# Patient Record
Sex: FEMALE | Race: BLACK OR AFRICAN AMERICAN
[De-identification: names, ages, dates, MRNs, and addresses within clinical notes are randomized per-mention and may not be internally consistent; named-entity substitution may affect disease eponyms.]

---

## 2017-08-04 NOTE — RADIOLOGY REPORT (SQ)
EXAM DESCRIPTION:  UPPER GI/SM BOWEL



COMPLETED DATE/TIME:  8/4/2017 10:33 am



REASON FOR STUDY:  ANOREXIA (R63.0) WITH WEIGHT LOSS (N63.4) R63.0  ANOREXIA R63.4  ABNORMAL WEIGHT L
OSS



COMPARISON:  CT abdomen pelvis 3/15/2013



TECHNIQUE:  Under fluoroscopic guidance, patient ingested effervescent  granules followed by thick an
d thin barium.  Fluoroscopic spot images and  routine radiographic images acquired and stored on PACS
.

Following evaluation of esophagus and stomach, additional  barium administered with serial delayed ab
dominal radiographs until colonic  identification.  Fluoroscopic images recorded of the terminal ileu
m.

12 MM BARIUM TABLET GIVEN: Yes.

No significant delay in passage.



FLUOROSCOPY TIME:  3.3 minutes

17 fluoroscopic digital images saved to PACS.



LIMITATIONS:  None.



FINDINGS:  NEUROMUSCULAR COORDINATION OF SWALLOW: Normal.  No aspiration.

ESOPHAGEAL MOTILITY: Normal peristalsis.  No esophageal spasm.

ESOPHAGEAL MUCOSA: Normal mucosa without masses or ulceration.

GASTRO-ESOPHAGEAL JUNCTION: No hiatal hernia or reflux.

STOMACH: Normal without masses or ulcerations.

GASTRIC OUTLET: No delay in emptying.  Normal pylorus.

DUODENAL BULB: Normal distention. No spasm or ulceration.

DUODENUM: Mucosa normal.  No extrinsic masses or malrotation.

PROXIMAL SMALL BOWEL: Normal as visualized.

JEJUNUM: Normal mucosal pattern.  No dilatation, segmentation, strictures or masses.

ILEUM: Normal mucosal pattern.  No dilatation, segmentation, strictures or masses.

TERMINAL ILEUM AND ILEO-CECAL VALVE: Normal mucosal pattern without cobble-stoning or stricture.  Nor
mal compression.

PROXIMAL COLON: Incompletely imaged.  No abnormality.

NON-GI TRACT STRUCTURES: No significant finding.

OTHER: No other significant finding.



IMPRESSION:  NORMAL DOUBLE CONTRAST BARIUM SWALLOW/UPPER GI SERIES/SMALL BOWEL SERIES.



COMMENT:  Quality :  Final reports for procedures using fluoroscopy that document radiation exp
osure indices, or exposure time and number of fluorographic images (if radiation exposure indices are
 not available)



TECHNICAL DOCUMENTATION:  JOB ID:  [id number]

 2011 [employer]- All Rights Reserved

## 2017-10-09 NOTE — RADIOLOGY REPORT (SQ)
EXAM DESCRIPTION:  CT ABD/PELVIS ORAL ONLY



COMPLETED DATE/TIME:  10/9/2017 9:17 am



REASON FOR STUDY:  ANOREXIA/R UPPER ABD PAIN R63.0  ANOREXIA R10.11  RIGHT UPPER QUADRANT PAIN F12.10
  CANNABIS ABUSE, UNCOMPLICATED



COMPARISON:  CT abdomen pelvis 3/15/2013



TECHNIQUE:  CT scan of the abdomen and pelvis performed without intravenous contrast.

Patient drank oral contrast.

Images reviewed with lung, soft tissue, and bone windows. Reconstructed coronal and sagittal MPR imag
es reviewed. All images stored on PACS.

All CT scanners at this facility use dose modulation, iterative reconstruction, and/or weight based d
osing when appropriate to reduce radiation dose to as low as reasonably achievable (ALARA).

[facility]: Dose Right  [facility]: CareDose    [facility]: Dose Right    [facility]: Teradose 4D    [facility]: Smart Technologies



RADIATION DOSE:  Up-to-date CT equipment and radiation dose reduction techniques were employed. CTDIv
ol: 3.5 mGy. DLP: 180 mGy-cm.mGy.



LIMITATIONS:  None.



FINDINGS:  There is a thin rim of fluid between the liver and right hemidiaphragm, best shown on aayush
nal image 47 and axial images 16 through 24.  There is a moderate amount of free cul-de-sac fluid.  T
his is a nonspecific finding.

LOWER CHEST: No significant findings. No nodules or infiltrates.

NON-CONTRASTED LIVER, SPLEEN, ADRENALS: Multiple hypodense liver lesions are present, similar compare
d to 2013 likely 1.9 cm hemangioma right lobe sub- diaphragmatic surface image 13, and hepatic cysts 
along the right lobe sub- diaphragmatic surface image 15, and left lobe liver near the falciform liga
ment image 27.  Spleen, adrenal glands unremarkable.

PANCREAS: No masses. No peripancreatic inflammatory changes.

GALLBLADDER: No identified stones by CT criteria. No inflammatory changes to suggest cholecystitis.

RIGHT KIDNEY AND URETER: No suspicious masses. Assessment limited by lack of IV contrast.  12 mm cyst
 right lower pole kidney.  No significant calcifications.   No hydronephrosis or hydroureter.

LEFT KIDNEY AND URETER: No suspicious masses. Assessment limited by lack of IV contrast.   No signifi
cant calcifications.   No hydronephrosis or hydroureter.

AORTA AND RETROPERITONEUM: No aneurysm. No retroperitoneal masses or adenopathy.

BOWEL AND PERITONEAL CAVITY: No obvious masses or inflammatory changes. No free fluid.  Patient drank
 oral contrast.  No CT evidence of bowel obstruction.  Few ascending colon diverticuli without CT sig
ns of acute diverticulitis.

APPENDIX: Normal.

PELVIS, BLADDER, AND ABDOMINAL WALL:No abnormal masses. No free fluid. Bladder normal.  Normal size u
terus and ovaries.

BONES: No significant findings.

OTHER: No other significant finding.



IMPRESSION:  MRI of the right subphrenic fluid between the liver and diaphragm.  Moderate amount of f
ree cul-de-sac fluid.  This is abnormal but nonspecific.

No CT evidence of bowel obstruction.  Ascending colon diverticuli without CT signs of diverticulitis.
  Normal appendix.



COMMENT:  Quality ID # 436: Final reports with documentation of one or more dose reduction techniques
 (e.g., Automated exposure control, adjustment of the mA and/or kV according to patient size, use of 
iterative reconstruction technique)



TECHNICAL DOCUMENTATION:  JOB ID:  [id number]

 2011 [employer]- All Rights Reserved

## 2018-05-09 ENCOUNTER — HOSPITAL ENCOUNTER (OUTPATIENT)
Dept: HOSPITAL 62 - END | Age: 63
Discharge: HOME | End: 2018-05-09
Attending: INTERNAL MEDICINE
Payer: MEDICAID

## 2018-05-09 VITALS — DIASTOLIC BLOOD PRESSURE: 63 MMHG | SYSTOLIC BLOOD PRESSURE: 102 MMHG

## 2018-05-09 DIAGNOSIS — R32: ICD-10-CM

## 2018-05-09 DIAGNOSIS — E78.00: ICD-10-CM

## 2018-05-09 DIAGNOSIS — Z79.82: ICD-10-CM

## 2018-05-09 DIAGNOSIS — K29.50: Primary | ICD-10-CM

## 2018-05-09 DIAGNOSIS — M19.90: ICD-10-CM

## 2018-05-09 DIAGNOSIS — K20.9: ICD-10-CM

## 2018-05-09 DIAGNOSIS — Z79.899: ICD-10-CM

## 2018-05-09 PROCEDURE — 43239 EGD BIOPSY SINGLE/MULTIPLE: CPT

## 2018-05-09 PROCEDURE — 88342 IMHCHEM/IMCYTCHM 1ST ANTB: CPT

## 2018-05-09 PROCEDURE — 88305 TISSUE EXAM BY PATHOLOGIST: CPT

## 2018-05-09 RX ADMIN — MIDAZOLAM HYDROCHLORIDE ONE MG: 1 INJECTION, SOLUTION INTRAMUSCULAR; INTRAVENOUS at 09:00

## 2018-05-09 RX ADMIN — MIDAZOLAM HYDROCHLORIDE ONE MG: 1 INJECTION, SOLUTION INTRAMUSCULAR; INTRAVENOUS at 09:04

## 2018-05-09 NOTE — OPERATIVE REPORT
Operative Report


DATE OF SURGERY: 05/09/18


Operative Report: 





The risks benefits and alternatives of the procedure explained to the patient 

in detail and informed consent is obtained.A GIF Olympus video scope was 

inserted into the patient's mouth and hypopharynx, the esophagus is identified 

intubated and insufflated, the scope was then advanced through the esophagus 

stomach and duodenum, retroflexion maneuver is done, the esophagus stomach and 

first and second portions of the duodenum examined


PREOPERATIVE DIAGNOSIS: Epigastric pain


POSTOPERATIVE DIAGNOSIS: Gastritis status post biopsy.  Esophagitis versus 

Camarena's status post biopsy


OPERATION: EGD with biopsy


SURGEON: HARIKA STRICKLAND


ANESTHESIA: Moderate Sedation - 4 mg of Versed, 25 mcg of fentanyl.  Conscious 

sedation monitoring time 30 minutes.


TISSUE REMOVED OR ALTERED: As noted above.


COMPLICATIONS: 





None.


ESTIMATED BLOOD LOSS: None.


INTRAOPERATIVE FINDINGS: As noted above.


PROCEDURE: 





Patient tolerated procedure well.


No immediate postprocedure complications are noted.


Patient discharged in good condition.


Discharge date 5/9/2018.


Discharge diet: Regular.


Discharge activity: Regular.


2-3 week follow-up to discuss findings.


Patient is instructed to call the office or proceed to the emergency room 

should there be any further problems or questions.

## 2018-05-30 ENCOUNTER — HOSPITAL ENCOUNTER (OUTPATIENT)
Dept: HOSPITAL 62 - END | Age: 63
Discharge: HOME | End: 2018-05-30
Attending: INTERNAL MEDICINE
Payer: MEDICAID

## 2018-05-30 VITALS — DIASTOLIC BLOOD PRESSURE: 80 MMHG | SYSTOLIC BLOOD PRESSURE: 159 MMHG

## 2018-05-30 DIAGNOSIS — I20.9: ICD-10-CM

## 2018-05-30 DIAGNOSIS — E78.00: ICD-10-CM

## 2018-05-30 DIAGNOSIS — K22.719: Primary | ICD-10-CM

## 2018-05-30 DIAGNOSIS — Z79.899: ICD-10-CM

## 2018-05-30 DIAGNOSIS — N39.46: ICD-10-CM

## 2018-05-30 DIAGNOSIS — I10: ICD-10-CM

## 2018-05-30 DIAGNOSIS — Z79.82: ICD-10-CM

## 2018-05-30 DIAGNOSIS — Z88.8: ICD-10-CM

## 2018-05-30 DIAGNOSIS — M19.90: ICD-10-CM

## 2018-05-30 DIAGNOSIS — K21.9: ICD-10-CM

## 2018-05-30 PROCEDURE — 43270 EGD LESION ABLATION: CPT

## 2018-05-30 NOTE — OPERATIVE REPORT
Operative Report


DATE OF SURGERY: 05/30/18


Operative Report: 





The risks benefits and alternatives of the procedure explained to the patient 

in detail and informed consent is obtained.A GIF Olympus video scope was 

inserted into the patient's mouth and hypopharynx, the esophagus is identified 

intubated and insufflated, the scope was then advanced through the esophagus 

stomach and duodenum, retroflexion maneuver is done, the esophagus stomach and 

first and second portions of the duodenum examined


PREOPERATIVE DIAGNOSIS: Camarena's esophagus


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


OPERATION: EGD with ablation


SURGEON: HARIKA STRICKLAND


ANESTHESIA: LMAC


TISSUE REMOVED OR ALTERED: None.


COMPLICATIONS: 





None.


ESTIMATED BLOOD LOSS: None.


INTRAOPERATIVE FINDINGS: As noted above.


PROCEDURE: 





Patient tolerated the procedure well.


No immediate postprocedure complications are noted.


Patient is discharged in good condition.


Discharge date 5/30/2018.


Discharge diet: Regular.


Discharge activity: Regular.


2-3 week follow-up to discuss findings.


Patient is instructed to call the office or proceed to the emergency room 

should there be any further problems or questions.

## 2018-06-21 ENCOUNTER — HOSPITAL ENCOUNTER (OUTPATIENT)
Dept: HOSPITAL 62 - WI | Age: 63
End: 2018-06-21
Attending: NURSE PRACTITIONER
Payer: MEDICAID

## 2018-06-21 DIAGNOSIS — Z12.31: Primary | ICD-10-CM

## 2018-06-21 PROCEDURE — 77067 SCR MAMMO BI INCL CAD: CPT

## 2018-06-22 NOTE — WOMENS IMAGING REPORT
EXAM DESCRIPTION:  BILAT SCREENING MAMMO W/CAD



COMPLETED DATE/TIME:  6/21/2018 10:18 am



REASON FOR STUDY:  ROUTINE SCREENING;Z12.31 Z12.31  ENCNTR SCREEN MAMMOGRAM FOR MALIGNANT NEOPLASM OF
 [place]



COMPARISON:  None.



TECHNIQUE:  Standard craniocaudal and mediolateral oblique views of each breast recorded using digita
l acquisition.



LIMITATIONS:  None.



FINDINGS:  No masses, calcifications or architectural distortion. No areas of suspicion.

Read with the assistance of CAD.

.[facility] - R2 [employer] Version 1.3

.[facility] - R2 [employer] Version 1.3

.[facility] - R2 [employer] Version 2.4

.[facility] - R2 Cenova Version 2.4

.[facility] - R2  Version 9.2



IMPRESSION:  NORMAL MAMMOGRAM.  BIRADS 1.



BREAST DENSITY:  c. The breasts are heterogeneously dense, which may obscure small masses.



BIRAD:  1 NEGATIVE



RECOMMENDATION:  ROUTINE SCREENING



COMMENT:  The patient has been notified of the results by letter per SA requirements. Additional no
tification policies are in place for contacting patient with suspicious or incomplete findings.

Quality ID #225: The American College of Radiology recommends an annual screening mammogram for women
 aged 40 years or over. This facility utilizes a reminder system to ensure that all patients receive 
reminder letters, and/or direct phone calls for appointments. This includes reminders for routine scr
eening mammograms, diagnostic mammograms, or other Breast Imaging Interventions when appropriate.  Th
is patient will be placed in the appropriate reminder system.

The American College of Radiology (ACR) has developed recommendations for screening MRI of the breast
s in certain patient populations, to be used in conjunction with mammography.  Breast MRI surveillanc
e may be appropriate for women with more than 20% lifetime risk of developing breast cancer  as deter
mined by genetic testing, significant family history of the disease, or history of mantle radiation f
or Hodgkins Disease.  ACR Practice Guidelines 2008.



TECHNICAL DOCUMENTATION:  FINDING NUMBER: (1)

ASSESSMENT: (1)

JOB ID:  [id number]

 2011 [employer]- All Rights Reserved



Reading location - IP/workstation name: [facility]

## 2020-12-08 ENCOUNTER — HOSPITAL ENCOUNTER (OUTPATIENT)
Dept: HOSPITAL 62 - WI | Age: 65
End: 2020-12-08
Attending: NURSE PRACTITIONER
Payer: SELF-PAY

## 2020-12-08 DIAGNOSIS — N64.4: Primary | ICD-10-CM

## 2020-12-08 PROCEDURE — 77066 DX MAMMO INCL CAD BI: CPT

## 2020-12-08 PROCEDURE — 76641 ULTRASOUND BREAST COMPLETE: CPT

## 2020-12-08 NOTE — WOMENS IMAGING REPORT
EXAM DESCRIPTION:  BILAT DIAGNOSTIC MAMMO W/CAD; U/S BREAST UNILATERAL, COMPL



IMAGES COMPLETED DATE/TIME:  12/8/2020 11:07 am; 12/8/2020 11:55 am



REASON FOR STUDY:  N64.4 MASTODYNIA; RT BREAST PAIN N64.4  MASTODYNIA



COMPARISON:  6/21/2018.



EXAM PARAMETERS:  Standard craniocaudal and mediolateral oblique views of each breast recorded using 
digital acquisition.

Additional true lateral images of the right breast acquired with tomosynthesis.

Read with the assistance of CAD:

.[facility] - [employer]  Version 9.2



LIMITATIONS:  None.



FINDINGS:  RIGHT BREAST

MASSES: No suspicious masses.

CALCIFICATIONS: No new or suspicious calcifications.

ARCHITECTURAL DISTORTION: None.

ASYMMETRY: None noted.

OTHER: No other significant findings.

LEFT BREAST

MASSES: No suspicious masses.

CALCIFICATIONS: No new or suspicious calcifications.

ARCHITECTURAL DISTORTION: None.

ASYMMETRY: None noted.

OTHER: No other significant finding.

BREAST ULTRASOUND:

TECHNIQUE: Static and dynamic grayscale images acquired of the entire right breast including the suba
reolar region. Selected color Doppler images recorded.

ELASTOGRAPHY PERFORMED: No.

LIMITATIONS: None.

FINDINGS:

MASS: No mass identified.  Normal glandular tissue.

ELASTOGRAPHY CHARACTERISTICS:Not applicable.

OTHER: No other significant finding.



IMPRESSION:  Stable bilateral mammogram.  No worrisome mammographic or sonographic findings in the ri
ght breast.



BREAST DENSITY:  b. There are scattered areas of fibroglandular density.



BIRAD:  ASSESSMENT:  1 Negative.



RECOMMENDATION:  RECOMMENDED FOLLOW UP: Birads 1 or 2: No breast imaging finding to explain the patie
nt's presenting complaint. Further intervention should be based on the degree of clinical suspicion.

SPECIFIC INTERVENTION/IMAGING/CONSULTATION RECOMMENDED:No additional intervention/ imaging/consultati
on needed at this time.

COMMUNICATION:The imaging findings were not discussed with the patient. Her referring provider has be
en notified of the findings.



COMMENT:  The patient has been notified of the results by letter per MQSA requirements. Additional no
tification policies are in place for contacting patient with suspicious or incomplete findings.

Quality ID #225: The American College of Radiology recommends an annual screening mammogram for women
 aged 40 years or over. This facility utilizes a reminder system to ensure that all patients receive 
reminder letters, and/or direct phone calls for appointments. This includes reminders for routine scr
eening mammograms, diagnostic mammograms, or other Breast Imaging Interventions when appropriate.  Th
is patient will be placed in the appropriate reminder system.



TECHNICAL DOCUMENTATION:  FINDING NUMBER: (1)

ASSESSMENT: (1)

JOB ID:  [id number]

 2011 [employer]- All Rights Reserved



Reading location - IP/workstation name: [email or web address]

## 2020-12-08 NOTE — WOMENS IMAGING REPORT
EXAM DESCRIPTION:  BILAT DIAGNOSTIC MAMMO W/CAD; U/S BREAST UNILATERAL, COMPL



IMAGES COMPLETED DATE/TIME:  12/8/2020 11:07 am; 12/8/2020 11:55 am



REASON FOR STUDY:  N64.4 MASTODYNIA; RT BREAST PAIN N64.4  MASTODYNIA



COMPARISON:  6/21/2018.



EXAM PARAMETERS:  Standard craniocaudal and mediolateral oblique views of each breast recorded using 
digital acquisition.

Additional true lateral images of the right breast acquired with tomosynthesis.

Read with the assistance of CAD:

.[facility] - [employer]  Version 9.2



LIMITATIONS:  None.



FINDINGS:  RIGHT BREAST

MASSES: No suspicious masses.

CALCIFICATIONS: No new or suspicious calcifications.

ARCHITECTURAL DISTORTION: None.

ASYMMETRY: None noted.

OTHER: No other significant findings.

LEFT BREAST

MASSES: No suspicious masses.

CALCIFICATIONS: No new or suspicious calcifications.

ARCHITECTURAL DISTORTION: None.

ASYMMETRY: None noted.

OTHER: No other significant finding.

BREAST ULTRASOUND:

TECHNIQUE: Static and dynamic grayscale images acquired of the entire right breast including the suba
reolar region. Selected color Doppler images recorded.

ELASTOGRAPHY PERFORMED: No.

LIMITATIONS: None.

FINDINGS:

MASS: No mass identified.  Normal glandular tissue.

ELASTOGRAPHY CHARACTERISTICS:Not applicable.

OTHER: No other significant finding.



IMPRESSION:  Stable bilateral mammogram.  No worrisome mammographic or sonographic findings in the ri
ght breast.



BREAST DENSITY:  b. There are scattered areas of fibroglandular density.



BIRAD:  ASSESSMENT:  1 Negative.



RECOMMENDATION:  RECOMMENDED FOLLOW UP: Birads 1 or 2: No breast imaging finding to explain the patie
nt's presenting complaint. Further intervention should be based on the degree of clinical suspicion.

SPECIFIC INTERVENTION/IMAGING/CONSULTATION RECOMMENDED:No additional intervention/ imaging/consultati
on needed at this time.

COMMUNICATION:The imaging findings were not discussed with the patient. Her referring provider has be
en notified of the findings.



COMMENT:  The patient has been notified of the results by letter per MQSA requirements. Additional no
tification policies are in place for contacting patient with suspicious or incomplete findings.

Quality ID #225: The American College of Radiology recommends an annual screening mammogram for women
 aged 40 years or over. This facility utilizes a reminder system to ensure that all patients receive 
reminder letters, and/or direct phone calls for appointments. This includes reminders for routine scr
eening mammograms, diagnostic mammograms, or other Breast Imaging Interventions when appropriate.  Th
is patient will be placed in the appropriate reminder system.



TECHNICAL DOCUMENTATION:  FINDING NUMBER: (1)

ASSESSMENT: (1)

JOB ID:  [id number]

 2011 [employer]- All Rights Reserved



Reading location - IP/workstation name: [email or web address]